# Patient Record
(demographics unavailable — no encounter records)

---

## 2024-10-09 NOTE — ASSESSMENT
[FreeTextEntry1] : 50 year M present with Bilateral Tinnitus 2/2 bilateral MHL.    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 AU Tymp A. Tinnitus Match to 6k hz.    Family history of Hearing loss Mother, Grandparents all needed hearing aids  Recommend Mixed Hearing Loss -Discussed a conductive hearing loss happens when sounds cannot get through the outer and middle ear. It may be hard to hear soft sounds. Louder sounds may be muffled. -Most common reasons for CHL are fluid in your middle ear from colds or allergies, ear infection, otitis media, TM Perforation, Wax -None of which patient currently has -Continue Hearing Aid Usage -CT IAC Ordered for further evaluation of CHL -Referral to Dr. Dalton Fowler for possible Ossicular reconstruction   Bilateral Tinnitus -Discussed that Hearing Aids may help with relieving some of the tinnitus. Tinnitus usually follows the same pattern of hearing loss and can be attributed to phantom sounds in the brain filling in for the loss of hearing in those particular frequencies -Discussed that Tinnitus usually can be attributed to phantom sounds in the brain filling in for sounds. If the tinnitus is brief only last for a few seconds with no loss of hearing associated. It is usually physiological and can be management conservatively -Discussed notched therapy, masking therapy, cognitive behavioral therapy, factors that may influence tinnitus, and discussed limited benefit of tinnitus supplements. -Tinnitus Hand Out Given -Patient states will try white noise machine

## 2024-10-09 NOTE — HISTORY OF PRESENT ILLNESS
[de-identified] : 50 year old male presents for hearing loss States wears bilateral hearing aids for about 5-6 years.  Last audiogram 1 year ago.  Reports intermittent itchy ears and tinnitus. Denies otalgia, otorrhea, ear infections, dizziness, vertigo, headaches related to hearing.

## 2024-10-09 NOTE — DATA REVIEWED
[de-identified] : An audiogram was ordered and performed including pure tones, tympanometry and speech testing for the patients complaint of hearing loss I have independently reviewed the patient's audiogram from today and my findings include  AU Moderate-severe to profound loss can not mask bone (masking dilemma) Mixed in at least one ear. AU Tymp A. Tinnitus Match to 6k hz.

## 2024-10-09 NOTE — HISTORY OF PRESENT ILLNESS
[de-identified] : 50 year old male presents for hearing loss States wears bilateral hearing aids for about 5-6 years.  Last audiogram 1 year ago.  Reports intermittent itchy ears and tinnitus. Denies otalgia, otorrhea, ear infections, dizziness, vertigo, headaches related to hearing.

## 2024-10-09 NOTE — REASON FOR VISIT
[Initial Consultation] : an initial consultation for [Hearing Loss] : hearing loss [FreeTextEntry2] : ears check

## 2024-10-09 NOTE — DATA REVIEWED
[de-identified] : An audiogram was ordered and performed including pure tones, tympanometry and speech testing for the patients complaint of hearing loss I have independently reviewed the patient's audiogram from today and my findings include  AU Moderate-severe to profound loss can not mask bone (masking dilemma) Mixed in at least one ear. AU Tymp A. Tinnitus Match to 6k hz.

## 2024-12-03 NOTE — HISTORY OF PRESENT ILLNESS
[de-identified] : 50 year old male referred by Dr. Jennifer Laura for evaluation of b/l otosclerosis. Patient with b/l hearing aid use x 5-6 years. Feels that hearing continues to gradually deteriorate.  Reports lifelong history of constant b/l tinnitus. Frequently with ear itchiness. Audio from 10/08/24 showed Mixed Hearing Loss.   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

## 2024-12-03 NOTE — ASSESSMENT
[FreeTextEntry1] : Exam today shows intact bilateral tympanic membrane without effusion or retraction.  I am able to fit a size 5 speculum into both ear canals without difficulty.  Bilateral facial function is normal.  I reviewed the audiogram which shows bilateral mixed hearing loss with large air-bone gap.  I reviewed temporal bone CT images and report which shows radiographic evidence of otosclerosis affecting both the oval window and the entire round niche bilaterally.  Discussed management of otosclerosis involving both oval and round window.  Counseled patient that because of significant disease involving round window, this may be a predominating factor for his mixed hearing loss, as opposed to oval window otosclerosis.  Counseled patient that even with successful stapedectomy he is more likely to have a significant mixed hearing loss that would still require hearing aids.  Would most strongly recommend continued bilateral hearing aid usage, and consideration of stapedectomy if hearing declines in the future to the point where he is no longer receiving adequate benefit from a hearing aid.  I spent a total of 30 minutes on the date of the encounter evaluating and treating the patient.